# Patient Record
Sex: MALE | Race: OTHER | NOT HISPANIC OR LATINO | URBAN - METROPOLITAN AREA
[De-identification: names, ages, dates, MRNs, and addresses within clinical notes are randomized per-mention and may not be internally consistent; named-entity substitution may affect disease eponyms.]

---

## 2018-12-23 ENCOUNTER — EMERGENCY (EMERGENCY)
Facility: HOSPITAL | Age: 38
LOS: 1 days | Discharge: HOME | End: 2018-12-23
Attending: EMERGENCY MEDICINE | Admitting: EMERGENCY MEDICINE
Payer: COMMERCIAL

## 2018-12-23 VITALS
TEMPERATURE: 98 F | SYSTOLIC BLOOD PRESSURE: 102 MMHG | DIASTOLIC BLOOD PRESSURE: 63 MMHG | OXYGEN SATURATION: 99 % | HEART RATE: 68 BPM | WEIGHT: 173.06 LBS | RESPIRATION RATE: 18 BRPM

## 2018-12-23 DIAGNOSIS — S01.81XA LACERATION WITHOUT FOREIGN BODY OF OTHER PART OF HEAD, INITIAL ENCOUNTER: ICD-10-CM
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
## 2018-12-23 PROCEDURE — 12013 RPR F/E/E/N/L/M 2.6-5.0 CM: CPT

## 2018-12-23 PROCEDURE — 99283 EMERGENCY DEPT VISIT LOW MDM: CPT | Mod: 25

## 2018-12-23 RX ORDER — CEPHALEXIN 500 MG
1 CAPSULE ORAL
Qty: 15 | Refills: 0 | OUTPATIENT
Start: 2018-12-23 | End: 2018-12-27

## 2018-12-23 RX ORDER — BACITRACIN ZINC 500 UNIT/G
1 OINTMENT IN PACKET (EA) TOPICAL ONCE
Qty: 0 | Refills: 0 | Status: COMPLETED | OUTPATIENT
Start: 2018-12-23 | End: 2018-12-23

## 2018-12-23 RX ORDER — CEPHALEXIN 500 MG
500 CAPSULE ORAL ONCE
Qty: 0 | Refills: 0 | Status: COMPLETED | OUTPATIENT
Start: 2018-12-23 | End: 2018-12-23

## 2018-12-23 RX ORDER — ACETAMINOPHEN 500 MG
650 TABLET ORAL ONCE
Qty: 0 | Refills: 0 | Status: COMPLETED | OUTPATIENT
Start: 2018-12-23 | End: 2018-12-23

## 2018-12-23 RX ADMIN — Medication 650 MILLIGRAM(S): at 21:26

## 2018-12-23 RX ADMIN — Medication 650 MILLIGRAM(S): at 21:27

## 2018-12-23 RX ADMIN — Medication 1 APPLICATION(S): at 19:43

## 2018-12-23 RX ADMIN — Medication 500 MILLIGRAM(S): at 21:27

## 2018-12-23 NOTE — ED PROVIDER NOTE - OBJECTIVE STATEMENT
37 y/o male with no PMHx who is otherwise healthy is present with laceration located on his chin. Pt states he was getting out of the shower when he slipped and fell hitting his chin on the edge of a sink. He reports slight pain and bleeding located under his chin. He was able to control the bleeding with a paper towel and since then the bleeding has subsided. Last tetanus vaccination is within the last 10 years. He denies the following: LOC, headaches, dizziness, neck/back pain, blurred vision, nausea, vomiting, numbness/tingling, use of blood thinners.

## 2018-12-23 NOTE — ED PROVIDER NOTE - NSFOLLOWUPINSTRUCTIONS_ED_ALL_ED_FT
YOU HAD A 3 CM LACERATION LOCATED UNDER YOUR CHIN. IT WAS REPAIRED WITH ABSORBABLE SUTURES WITH DERMABOND SEALED. PLEASE DO NOT LET IT GET WET FOR THE FIRST 24 HOURS. PLEASE DO NOT TOUCH OR PICK AT THE GLUE. PLEASE DO NOT APPLY ANYTHING ON TOP OF THE GLUE. WASH WITH SOAP AND WATER. PLEASE FOLLOW UP WITH YOUR PHYSICIAN IN 3 DAYS FOR WOUND CHECK. Return to the Emergency Department if you have any new or worsening symptoms, or if you have any concerns.    Facial Laceration  ImageA facial laceration is a cut on the face. The injuries can be painful and can cause bleeding. Lacerations usually heal quickly, but they need special care to reduce scarring.    How is this diagnosed?  This condition is diagnosed by:    Medical history. Your health care provider will ask for details about how the injury occurred.  Physical exam. Your health care provider will examine the wound to determine how deep the cut is.    How is this treated?  Treatment for this condition depends on the severity of the cut, including the risk of infection and how deep the wound is.    The wound will be cleaned to prevent infection.  Your health care provider will decide whether to close the wound. Whether the wound will be closed will depend on:    The risk of infection. If there is an increased risk of infection, the wound will not be closed.  The amount of time since the injury occurred. The chance of a successful closure will depend on how old the wound is.    If closure is appropriate:    Your health care provider will use stitches (sutures), wound glue (adhesive), or skin adhesive strips to repair the laceration.  These tools will bring the skin edges together to allow for faster healing and a better cosmetic outcome.    You may be given pain medicine.  If needed, you may also be given a tetanus shot.    Follow these instructions at home:  Take over-the-counter and prescription medicines only as told by your health care provider.  Follow your health care provider’s instructions for wound care. These instructions will vary depending on the technique used for closing the wound.  For sutures:     Keep the wound clean and dry.  If you were given a bandage (dressing), you should change it at least once a day. Also change the dressing if it becomes wet or dirty, or as directed by your health care provider.  Wash the wound with soap and water 2 times a day. Rinse the wound off with water to remove all soap. Pat the wound dry with a clean towel.  After cleaning, apply a thin layer of the antibiotic ointment recommended by your health care provider. This will help prevent infection and keep the dressing from sticking.  You may shower as usual after the first 24 hours. Do not soak the wound in water until the sutures are removed.  Get your sutures removed as directed by your health care provider. With facial lacerations, sutures should be taken out after 4–5 days to avoid stitch marks.  Wait a few days after your sutures are removed before applying any makeup.  For skin adhesive strips:     Keep the wound clean and dry.  Do not get the skin adhesive strips wet. You may bathe carefully, using caution to keep the wound dry.  If the wound gets wet, pat it dry with a clean towel.  Skin adhesive strips will fall off on their own. You may trim the strips as the wound heals. Do not remove skin adhesive strips that are still stuck to the wound. With time, they will fall off on their own.  For wound adhesive:     You may briefly wet your wound in the shower or bath:    Do not soak or scrub the wound.  Do not swim.  Avoid periods of heavy sweating until the skin adhesive has fallen off on its own.  After showering or bathing, gently pat the wound dry with a clean towel.    Do not apply liquid medicine, cream medicine, ointment medicine, or makeup to your wound while the skin adhesive is in place. This may loosen the film before your wound is healed.  If a dressing is placed over the wound, be careful not to apply tape directly over the skin adhesive. This may cause the adhesive to be pulled off before the wound is healed.  Avoid prolonged exposure to sunlight or tanning lamps while the skin adhesive is in place.  The skin adhesive will usually remain in place for 5–10 days, then naturally fall off the skin. Do not pick at the adhesive film.  After healing:     Once the wound has healed:    Cover the wound with sunscreen during the day for 1 full year. This can help minimize scarring.  Exposure to ultraviolet light in the first year will darken the scar.  It can take 1–2 years for the scar to lose its redness and to heal completely.    Contact a health care provider if:  You have a fever.  You see a yellowish-white fluid (pus) coming from the wound.  Get help right away if:  You have redness, pain, or swelling around the wound.  This information is not intended to replace advice given to you by your health care provider. Make sure you discuss any questions you have with your health care provider.

## 2018-12-23 NOTE — ED ADULT NURSE NOTE - OBJECTIVE STATEMENT
The pt is a 37 y/o male who came in to ED for evaluation of trip and fall. Pt tripped getting out of tub today. Pt noted to have laceration to chin. Pt reports TD vaccine is up to date within the last 10 years. Pt denies any LOC, denies any blurry vision, nausea or any other complaints.

## 2018-12-23 NOTE — ED ADULT TRIAGE NOTE - OTHER COMPLAINTS
No LOC. Laceration to chin noted. No blood thinner. Pt is A&O x3, able to speak coherently in full sentences.

## 2018-12-23 NOTE — ED PROVIDER NOTE - ATTENDING CONTRIBUTION TO CARE
pt seen ane examined with PA- agree with treatment management and plan. pt seen and [id number] with PA- agree with treatment management and plan

## 2018-12-23 NOTE — ED ADULT NURSE NOTE - NSIMPLEMENTINTERV_GEN_ALL_ED
Implemented All Universal Safety Interventions:  [place] to call system. Call bell, personal items and telephone within reach. Instruct patient to call for assistance. Room bathroom lighting operational. Non-slip footwear when patient is off stretcher. Physically safe environment: no spills, clutter or unnecessary equipment. Stretcher in lowest position, wheels locked, appropriate side rails in place.

## 2021-02-05 NOTE — ED PROVIDER NOTE - MEDICAL DECISION MAKING DETAILS
Reviewed lipids/alt/ast showing   !LIPID/HEPATIC Latest Ref Rng & Units 4/30/2020 2/2/2021   CHOLESTEROL <200 mg/dL 205 163   TRIGLYCERIDES <150 mg/dL 217 165   HDL CHOLESTEROL >40 mg/dL 50 49   LDL CHOLESTEROL, CALCULATED <100 mg/dL 112 81   VLDL-CHOLESTEROL 0 - 30 mg/dL     CHOLESTEROL/HDL RATIO 0.0 - 5.0     AST 12 - 37 U/L  11   ALT 12 - 68 U/L  22   HOMOCYSTEINE UMOL/L 4.0 - 12.0 umol/L       Current meds are   Current Outpatient Medications   Medication     Cholecalciferol (VITAMIN D-3) 125 MCG (5000 UT) TABS     cyanocobalamin (VITAMIN B-12) 1000 MCG SUBL sublingual tablet     metFORMIN (GLUCOPHAGE) 500 MG tablet     rosuvastatin (CRESTOR) 5 MG tablet     No current facility-administered medications for this visit.       Attempted to call pt to review results, left message for pt to call back. Idalia GAGE      pt with 3 cm laceration located inferior to chin. wound irrigated. repair using absorbable sutures with dermabond applied on top. Advised wound care instructions. will give keflex ppx. fu with pmd in 3 days for wound check.

## 2023-07-16 NOTE — ED ADULT NURSE NOTE - NSFALLRSKASSESSTYPE_ED_ALL_ED
"Mr. Ya is a 35yo M who presents with a CC of N/V. Found to be in DKA. Known diabetic on insulin. He states that he "left" his insulin somewhere but states he can get it on discharge. Episode of hematemesis.   "
Initial (On Arrival)